# Patient Record
Sex: FEMALE | Race: WHITE
[De-identification: names, ages, dates, MRNs, and addresses within clinical notes are randomized per-mention and may not be internally consistent; named-entity substitution may affect disease eponyms.]

---

## 2019-06-09 NOTE — NUR
PER PA - PT HAS LEFT HER ROOM AND EXITED THE ER - CHECKED OUTSIDE AND CANNOT 
FIND PT - SECURITY CALLED.  PATIENT HAS IV IN PLACE.  WILL CALL [facility] IF WE CAN'T 
FIND HER IN THE NEXT FEW MINUTES.

## 2021-05-30 ENCOUNTER — HOSPITAL ENCOUNTER (EMERGENCY)
Dept: HOSPITAL 94 - ER | Age: 41
LOS: 1 days | Discharge: HOME | End: 2021-05-31
Payer: MEDICAID

## 2021-05-30 VITALS — WEIGHT: 185.39 LBS | BODY MASS INDEX: 37.37 KG/M2 | HEIGHT: 59 IN

## 2021-05-30 DIAGNOSIS — Z86.14: ICD-10-CM

## 2021-05-30 DIAGNOSIS — Z88.8: ICD-10-CM

## 2021-05-30 DIAGNOSIS — H53.8: ICD-10-CM

## 2021-05-30 DIAGNOSIS — R42: ICD-10-CM

## 2021-05-30 DIAGNOSIS — R11.0: ICD-10-CM

## 2021-05-30 DIAGNOSIS — G43.909: Primary | ICD-10-CM

## 2021-05-30 DIAGNOSIS — F17.200: ICD-10-CM

## 2021-05-30 DIAGNOSIS — Z79.899: ICD-10-CM

## 2021-05-30 PROCEDURE — 99284 EMERGENCY DEPT VISIT MOD MDM: CPT

## 2021-05-30 PROCEDURE — 96374 THER/PROPH/DIAG INJ IV PUSH: CPT

## 2021-05-30 PROCEDURE — 96361 HYDRATE IV INFUSION ADD-ON: CPT

## 2021-05-30 PROCEDURE — 96375 TX/PRO/DX INJ NEW DRUG ADDON: CPT

## 2021-05-31 VITALS — SYSTOLIC BLOOD PRESSURE: 133 MMHG | DIASTOLIC BLOOD PRESSURE: 75 MMHG

## 2021-09-07 ENCOUNTER — HOSPITAL ENCOUNTER (EMERGENCY)
Dept: HOSPITAL 94 - ER | Age: 41
Discharge: LEFT BEFORE DISCHARGE | End: 2021-09-07
Payer: MEDICAID

## 2021-09-07 DIAGNOSIS — Z04.81: Primary | ICD-10-CM

## 2021-09-07 DIAGNOSIS — Z53.21: ICD-10-CM
